# Patient Record
Sex: FEMALE | Race: WHITE | NOT HISPANIC OR LATINO | Employment: UNEMPLOYED | URBAN - METROPOLITAN AREA
[De-identification: names, ages, dates, MRNs, and addresses within clinical notes are randomized per-mention and may not be internally consistent; named-entity substitution may affect disease eponyms.]

---

## 2020-02-20 ENCOUNTER — TELEPHONE (OUTPATIENT)
Dept: PAIN MEDICINE | Facility: CLINIC | Age: 60
End: 2020-02-20

## 2020-02-20 DIAGNOSIS — M54.50 LOW BACK PAIN, UNSPECIFIED BACK PAIN LATERALITY, UNSPECIFIED CHRONICITY, UNSPECIFIED WHETHER SCIATICA PRESENT: Primary | ICD-10-CM

## 2020-02-20 NOTE — TELEPHONE ENCOUNTER
Patient is a direct referral from Dr. TRA Watson. ProcedureOrders, Progress Notes, Insurance Information, and MRI Results have been scanned into the media portion of patients chart. Contacted patient to schedule procedure. Date, time, and instructions given and mailed./ Patient verbalized understanding.

## 2020-02-28 ENCOUNTER — HOSPITAL ENCOUNTER (OUTPATIENT)
Facility: OTHER | Age: 60
Discharge: HOME | End: 2020-02-28
Attending: ANESTHESIOLOGY | Admitting: ANESTHESIOLOGY
Payer: MEDICAID

## 2020-02-28 VITALS
OXYGEN SATURATION: 97 % | HEART RATE: 63 BPM | BODY MASS INDEX: 29.82 KG/M2 | RESPIRATION RATE: 18 BRPM | WEIGHT: 190 LBS | HEIGHT: 67 IN | TEMPERATURE: 98 F | DIASTOLIC BLOOD PRESSURE: 100 MMHG | SYSTOLIC BLOOD PRESSURE: 232 MMHG

## 2020-02-28 DIAGNOSIS — G89.29 CHRONIC PAIN: ICD-10-CM

## 2020-02-28 DIAGNOSIS — M54.16 LUMBAR RADICULOPATHY: Primary | ICD-10-CM

## 2020-02-28 DIAGNOSIS — G89.4 CHRONIC PAIN SYNDROME: ICD-10-CM

## 2020-02-28 PROCEDURE — 62323 NJX INTERLAMINAR LMBR/SAC: CPT | Mod: ,,, | Performed by: ANESTHESIOLOGY

## 2020-02-28 PROCEDURE — 62323 NJX INTERLAMINAR LMBR/SAC: CPT | Performed by: ANESTHESIOLOGY

## 2020-02-28 PROCEDURE — 25000003 PHARM REV CODE 250: Performed by: ANESTHESIOLOGY

## 2020-02-28 PROCEDURE — 62323 PR INJ LUMBAR/SACRAL, W/IMAGING GUIDANCE: ICD-10-PCS | Mod: ,,, | Performed by: ANESTHESIOLOGY

## 2020-02-28 PROCEDURE — 63600175 PHARM REV CODE 636 W HCPCS: Performed by: ANESTHESIOLOGY

## 2020-02-28 PROCEDURE — 25500020 PHARM REV CODE 255: Performed by: ANESTHESIOLOGY

## 2020-02-28 RX ORDER — BUPIVACAINE HYDROCHLORIDE 2.5 MG/ML
INJECTION, SOLUTION EPIDURAL; INFILTRATION; INTRACAUDAL
Status: DISCONTINUED | OUTPATIENT
Start: 2020-02-28 | End: 2020-02-28 | Stop reason: HOSPADM

## 2020-02-28 RX ORDER — ALPRAZOLAM 0.5 MG/1
0.5 TABLET ORAL
Status: ACTIVE | OUTPATIENT
Start: 2020-02-28

## 2020-02-28 RX ORDER — DEXAMETHASONE SODIUM PHOSPHATE 10 MG/ML
INJECTION INTRAMUSCULAR; INTRAVENOUS
Status: DISCONTINUED | OUTPATIENT
Start: 2020-02-28 | End: 2020-02-28 | Stop reason: HOSPADM

## 2020-02-28 RX ORDER — METHYLPREDNISOLONE ACETATE 80 MG/ML
INJECTION, SUSPENSION INTRA-ARTICULAR; INTRALESIONAL; INTRAMUSCULAR; SOFT TISSUE
Status: DISCONTINUED | OUTPATIENT
Start: 2020-02-28 | End: 2020-02-28 | Stop reason: HOSPADM

## 2020-02-28 RX ORDER — LIDOCAINE HYDROCHLORIDE 10 MG/ML
INJECTION INFILTRATION; PERINEURAL
Status: DISCONTINUED | OUTPATIENT
Start: 2020-02-28 | End: 2020-02-28 | Stop reason: HOSPADM

## 2020-02-28 NOTE — DISCHARGE INSTRUCTIONS

## 2020-02-28 NOTE — H&P
"HPI  Patient presenting for Procedure(s) (LRB):  LUMBAR PRO L5/S1 (N/A)     Patient on Anti-coagulation No    No health changes since previous encounter.      History reviewed. No pertinent past medical history.  History reviewed. No pertinent surgical history.  Review of patient's allergies indicates:  No Known Allergies   Current Facility-Administered Medications   Medication    ALPRAZolam tablet 0.5 mg       PMHx, PSHx, Allergies, Medications reviewed in epic    ROS negative except pain complaints in HPI    OBJECTIVE:    BP (!) 190/96   Pulse 74   Temp 97.8 °F (36.6 °C) (Oral)   Resp 18   Ht 5' 7" (1.702 m)   Wt 86.2 kg (190 lb)   SpO2 100%   Breastfeeding? No   BMI 29.76 kg/m²     PHYSICAL EXAMINATION:    GENERAL: Well appearing, in no acute distress, alert and oriented x3.  PSYCH:  Mood and affect appropriate.  SKIN: Skin color, texture, turgor normal, no rashes or lesions which will impact the procedure.  CV: RRR with palpation of the radial artery.  PULM: No evidence of respiratory difficulty, symmetric chest rise. Clear to auscultation.  NEURO: Cranial nerves grossly intact.    Plan:    Proceed with procedure as planned Procedure(s) (LRB):  LUMBAR PRO L5/S1 (N/A)    Carlos A Zamora  02/28/2020    I have seen the patient with the resident physician.  We have come up with the above plan.  The patient is in agreement with our plan. I agree with the above note which I have edited where appropriate.     Eufemia Santillan MD  02/28/2020        "

## 2020-02-28 NOTE — OP NOTE
Date of Procedure: 02/28/2020    Procedure: L5/S1 Interlaminar Epidural Steroid Injection    Referring Provider: Dr. Watson    Pre-op diagnosis: Low back pain, unspecified back pain laterality, unspecified chronicity, unspecified whether sciatica present [M54.5]    Post-op diagnosis: Low back pain, unspecified back pain laterality, unspecified chronicity, unspecified whether sciatica present [M54.5]    Physician: Dr. Eufemia Santillan     Assistant: Dr. Zamora    Anesthestia: local    EBL: None    Specimens: None    All medications, allergies, and relevant histories were reviewed. No recent antibiotics or infections.  A time-out was taken to verify the correct patient, procedure, laterality, and appropriate medications/allergies.    Fluoroscopically-Guided, Contrast-Controlled Lumbar Interlaminar Epidural Steroid Injection:     Following denial of allergy and review of potential side effects and complications including but not necessarily limited to infection, allergic reaction, local tissue breakdown, nerve injury, paresis, paralysis, spinal cord injury, and seizure, the patient indicated they understood and agreed to proceed.     Patient was placed in prone position. Lumbar area was prepped and draped in sterile manner. Local Xylocaine 1% was given subcutaneously at the level L5/S1. An 18-gauge Tuohy needle was introduced atraumatically in the interspinous space and advanced up to the epidural space using fluoroscopic guidance in the AP position. Loss of resistance to air was used to identify the epidural space using fluoroscopic guidance in the lateral position. Following negative aspiration for blood and CSF and confirming the absence of paresthesias, injection of approximately 1 cc of Omnipaque 300 demonstrated excellent epidural spread without vascular or intrathecal uptake. At this point, 2cc of 0.25% marcaine with 80 mg of Depo-Medrol was injected without complication. The needle was flushed with 1% lidocaine  and withdrawn.     Patient tolerated the procedure well without any side effects. No noted complications.     The patient was followed post procedure and discharged under their own power in excellent condition in the company of a responsible adult.     Future Management:   If helpful, can repeat as needed.    Follow up with Dr. Watson    I certify that I provided the above services.  I was present for the entire procedure, which was performed by myself with the assistance of the resident physician.  There were no parts of the procedure that were performed not by myself or without my direct supervision.

## (undated) DEVICE — DRESSING LEUKOPLAST FLEX 1X3IN